# Patient Record
Sex: FEMALE | Race: BLACK OR AFRICAN AMERICAN | NOT HISPANIC OR LATINO | Employment: OTHER | URBAN - METROPOLITAN AREA
[De-identification: names, ages, dates, MRNs, and addresses within clinical notes are randomized per-mention and may not be internally consistent; named-entity substitution may affect disease eponyms.]

---

## 2019-03-23 ENCOUNTER — HOSPITAL ENCOUNTER (EMERGENCY)
Facility: HOSPITAL | Age: 54
Discharge: HOME | End: 2019-03-23
Attending: EMERGENCY MEDICINE
Payer: MEDICARE

## 2019-03-23 VITALS
DIASTOLIC BLOOD PRESSURE: 76 MMHG | WEIGHT: 292 LBS | TEMPERATURE: 99 F | HEART RATE: 74 BPM | HEIGHT: 66 IN | RESPIRATION RATE: 14 BRPM | BODY MASS INDEX: 46.93 KG/M2 | SYSTOLIC BLOOD PRESSURE: 169 MMHG | OXYGEN SATURATION: 96 %

## 2019-03-23 DIAGNOSIS — S91.209A AVULSION OF TOENAIL, INITIAL ENCOUNTER: ICD-10-CM

## 2019-03-23 DIAGNOSIS — B35.1 ONYCHOMYCOSIS: Primary | ICD-10-CM

## 2019-03-23 PROCEDURE — 25000003 PHARM REV CODE 250: Performed by: EMERGENCY MEDICINE

## 2019-03-23 PROCEDURE — 99283 EMERGENCY DEPT VISIT LOW MDM: CPT

## 2019-03-23 PROCEDURE — 11730 AVULSION NAIL PLATE SIMPLE 1: CPT

## 2019-03-23 RX ORDER — CEPHALEXIN 500 MG/1
500 CAPSULE ORAL EVERY 8 HOURS
Qty: 15 CAPSULE | Refills: 0 | Status: SHIPPED | OUTPATIENT
Start: 2019-03-23 | End: 2019-03-28

## 2019-03-23 RX ORDER — MUPIROCIN 20 MG/G
1 OINTMENT TOPICAL
Status: COMPLETED | OUTPATIENT
Start: 2019-03-23 | End: 2019-03-23

## 2019-03-23 RX ADMIN — MUPIROCIN 22 G: 20 OINTMENT TOPICAL at 03:03

## 2019-03-23 NOTE — ED PROVIDER NOTES
Encounter Date: 3/23/2019  SORT:   53-year-old female with history of diabetes, neuropathy and gout presenting for evaluation of left great toenail pain that began this morning. Denies trauma, fever, chills, purulent drainage. Reports treated for fungal infection of nail but is afraid to remove nail due to DM. Sees podiatry alonzo Pan for [place]. Initial orders placed. MAGDALENA Burton PA-C      SCRIBE #1 NOTE: I, Denise Villalobos, am scribing for, and in the presence of,  Dodie Lagos MD. I have scribed the following portions of the note - Other sections scribed: HPI and ROS.       History     Chief Complaint   Patient presents with    Toe Pain     states right great nail bed is coming off.   pt is a diabetic.  denies trauma.     CC: Toe Pain    HPI: This 53 y.o. Female, with a medical history of diabetes and neuropathy, presents to the ED c/o acute, constant, moderate (5/10) left great toe pain. Pt reports that while in the shower this morning she noticed that her left great toenail was lifting off of the the nail bed and felt soft to the touch. She notes that she is currently being treated for a toenail fungus. No recent trauma. Pt denies fever or any other associated symptoms. No alleviating factors. Pt notes that she currently lives in [place] and is visiting family in the area.       The history is provided by the patient. No  was used.     Review of patient's allergies indicates:  No Known Allergies  No past medical history on file.  No past surgical history on file.  No family history on file.  Social History     Tobacco Use    Smoking status: Not on file   Substance Use Topics    Alcohol use: Not on file    Drug use: Not on file     Review of Systems   Constitutional: Negative for chills and fever.   HENT: Negative for congestion, ear pain, rhinorrhea and sore throat.    Eyes: Negative for pain and visual disturbance.   Respiratory: Negative for cough and shortness of breath.     Cardiovascular: Negative for chest pain.   Gastrointestinal: Negative for abdominal pain, diarrhea, nausea and vomiting.   Genitourinary: Negative for dysuria.   Musculoskeletal: Negative for back pain and neck pain.        (+) left great toe pain (toe nail is lifting off of the nail bed)   Skin: Negative for rash.   Neurological: Negative for headaches.       Physical Exam     Initial Vitals [03/23/19 1443]   BP Pulse Resp Temp SpO2   (!) 155/70 85 18 98.8 °F (37.1 °C) 95 %      MAP       --         Physical Exam   Constitutional: Well-developed, Well-nourished, No acute distressed, Alert  Eyes: Conjunctiva normal  Neck: Supple, ROM normal  Musc: Normal ROM, No obvious joint swelling  Lymph: No lower extremity edema  Neuro: oriented x 3, decreased bilateral foot sensation in stocking distribution  Skin: Pink, warm, dry.  No rashes, + onchomychosis, L great toenail lifted from nail bed    Previous medical record and nursing documentation reviewed where available.          ED Course   I & D - Incision and Drainage  Date/Time: 4/13/2019 3:49 PM  Performed by: Dodie Lagos MD  Authorized by: Dodie Lagos MD   Indications for incision and drainage: partial toenail avulsion.  Body area: lower extremity  Location details: left big toe  Technical procedures used: Toenail largely already avulsed and entire toe lacking sensation already due to neuropathy so no anesthesia needed.  nail bluntly dissected from nailbed and removed from beneath epinychium   Complications: No        Labs Reviewed - No data to display       Imaging Results    None          Medical Decision Making:   ED Management:  53 year old female diabetic with advanced peripheral neuropathy presents to the ED with toenail problem.  Great toeneail is nearly completely avulsed due to onchomyocosis.  At this point, it serves really only as a hazard as it is completely lifted from most of the nail bed.  I have removed the nail and provided empiric  antibiotics.  There does not seem to beany significant open wound but I given diabetic status and area of concern, I feel empiric antibiotics are warranted.  Will discharge home with recommendations to follow up with her podiatrist and return to the ED if symptoms worsen.            Scribe Attestation:   Scribe #1: I performed the above scribed service and the documentation accurately describes the services I performed. I attest to the accuracy of the note.    Attending Attestation:           Physician Attestation for Scribe:  Physician Attestation Statement for Scribe #1: I, Dodie Lagos MD, reviewed documentation, as scribed by Denise Villalobos in my presence, and it is both accurate and complete.                    Clinical Impression:       ICD-10-CM ICD-9-CM   1. Onychomycosis B35.1 110.1   2. Avulsion of toenail, initial encounter S91.209A 893.0                                Dodie Lagos MD  04/13/19 1600

## 2019-03-23 NOTE — DISCHARGE INSTRUCTIONS
Check feet every day.  Return to the ED for worsened symptoms including increased swelling, pain, drainage or other symptoms.

## 2019-03-23 NOTE — ED TRIAGE NOTES
Pt arrived to ED with c/o toe pain. Pt states R big toe nail is coming off. Hx diabetes. NAD. Pt connected to continuous cardiac monitor, bp cuff, and pulse ox.